# Patient Record
Sex: FEMALE | Race: BLACK OR AFRICAN AMERICAN | NOT HISPANIC OR LATINO | Employment: UNEMPLOYED | URBAN - METROPOLITAN AREA
[De-identification: names, ages, dates, MRNs, and addresses within clinical notes are randomized per-mention and may not be internally consistent; named-entity substitution may affect disease eponyms.]

---

## 2019-10-13 ENCOUNTER — HOSPITAL ENCOUNTER (EMERGENCY)
Facility: HOSPITAL | Age: 29
Discharge: HOME | End: 2019-10-13
Attending: EMERGENCY MEDICINE
Payer: MEDICAID

## 2019-10-13 VITALS
HEIGHT: 67 IN | WEIGHT: 155 LBS | RESPIRATION RATE: 14 BRPM | DIASTOLIC BLOOD PRESSURE: 56 MMHG | SYSTOLIC BLOOD PRESSURE: 140 MMHG | BODY MASS INDEX: 24.33 KG/M2 | HEART RATE: 73 BPM | OXYGEN SATURATION: 99 % | TEMPERATURE: 99 F

## 2019-10-13 DIAGNOSIS — Z59.00 HOMELESSNESS: ICD-10-CM

## 2019-10-13 DIAGNOSIS — K08.89 DENTALGIA: Primary | ICD-10-CM

## 2019-10-13 PROCEDURE — 99283 EMERGENCY DEPT VISIT LOW MDM: CPT

## 2019-10-13 RX ORDER — PENICILLIN V POTASSIUM 500 MG/1
500 TABLET, FILM COATED ORAL 4 TIMES DAILY
Qty: 40 TABLET | Refills: 0 | Status: SHIPPED | OUTPATIENT
Start: 2019-10-13 | End: 2019-10-13 | Stop reason: CLARIF

## 2019-10-13 RX ORDER — IBUPROFEN 600 MG/1
600 TABLET ORAL EVERY 6 HOURS PRN
Qty: 20 TABLET | Refills: 0 | Status: ON HOLD | OUTPATIENT
Start: 2019-10-13 | End: 2023-09-24 | Stop reason: HOSPADM

## 2019-10-13 RX ORDER — IBUPROFEN 600 MG/1
600 TABLET ORAL EVERY 6 HOURS PRN
Qty: 20 TABLET | Refills: 0 | Status: SHIPPED | OUTPATIENT
Start: 2019-10-13 | End: 2019-10-13 | Stop reason: SDUPTHER

## 2019-10-13 SDOH — SOCIAL DETERMINANTS OF HEALTH (SDOH): HOMELESSNESS UNSPECIFIED: Z59.00

## 2019-10-14 NOTE — ED PROVIDER NOTES
"Encounter Date: 10/13/2019    SCRIBE #1 NOTE: I, Kristi Teixeira, am scribing for, and in the presence of,  Guy J. Lefort, MD. I have scribed the entire note.       History     Chief Complaint   Patient presents with    Dental Pain     Pt presents to ED secondary to dental pain intermittent x1-2 years. Patient reports being homeless and being "put out by my friend today, so I figured I'd come for a checkup too."      Time seen by provider: 11:02 PM    This is a 29 y.o. female who presents with complaint of intermittent R lower dental pain x1-2 years that has worsened today. Her associated symptoms include RUE pain. The patient denies any N/V/D or other concerning symptoms. She is homeless as her friend recently kicked her out.    The history is provided by the patient.     Review of patient's allergies indicates:  No Known Allergies  History reviewed. No pertinent past medical history.  No past surgical history on file.  History reviewed. No pertinent family history.  Social History     Tobacco Use    Smoking status: Not on file   Substance Use Topics    Alcohol use: Not on file    Drug use: Not on file     Review of Systems   Constitutional: Negative for chills and fever.   HENT: Positive for dental problem (R lower dental pain). Negative for sore throat.    Eyes: Negative for visual disturbance.   Respiratory: Negative for shortness of breath.    Cardiovascular: Negative for chest pain.   Gastrointestinal: Negative for abdominal pain, nausea and vomiting.   Genitourinary: Negative for difficulty urinating, frequency and urgency.   Musculoskeletal: Negative for back pain.   Skin: Negative for rash and wound.   Neurological: Negative for syncope, weakness and headaches.   Psychiatric/Behavioral: Negative for agitation, behavioral problems and confusion.       Physical Exam     Initial Vitals [10/13/19 2242]   BP Pulse Resp Temp SpO2   (!) 140/56 73 14 98.8 °F (37.1 °C) 99 %      MAP       --         Physical " Exam    Nursing note and vitals reviewed.  Constitutional: She appears well-developed and well-nourished. She is not diaphoretic. No distress.   HENT:   Head: Normocephalic and atraumatic.   Mouth/Throat: Oropharynx is clear and moist.   R lower molar tooth decay w/o fluctuance; no submandibular swelling   Eyes: Conjunctivae and EOM are normal. Pupils are equal, round, and reactive to light.   Neck: Normal range of motion. Neck supple.   Cardiovascular: Normal rate, regular rhythm and normal heart sounds. Exam reveals no gallop and no friction rub.    No murmur heard.  Pulmonary/Chest: Breath sounds normal. She has no wheezes. She has no rhonchi. She has no rales.   Abdominal: Soft. Bowel sounds are normal. There is no tenderness. There is no rebound and no guarding.   Musculoskeletal: Normal range of motion. She exhibits no edema or tenderness.   Lymphadenopathy:     She has cervical adenopathy (mild R).   Neurological: She is alert and oriented to person, place, and time. She has normal strength.   Skin: Skin is warm and dry. Capillary refill takes less than 2 seconds. No rash noted.         ED Course   Procedures  Labs Reviewed - No data to display            Medical Decision Making:   Differential Diagnosis:   Differential Diagnosis includes, but is not limited to:  Dany's angina, acute necrotizing ulcerative gingivitis, epiglottitis, parotitis, gingival abscess, facial cellulitis, peritonsillar/retropharyngeal abscess, sialolithiasis, periapical abscess, pulpitis, dental fracture, dental caries, aphthous ulcer.    ED Management:  After complete evaluation, including thorough history and physical exam, the patient's symptoms are most consistent with benign dentalgia due to caries. Physical exam is benign, without significant facial, tongue, or neck swelling to suggest cellulitis, abscess, or Dany's angina. The patient is tolerating secretions without drooling, dysphagia, or voice changes to suggest deep  space neck infection. There is no intraoral or gingival fluctuance to suggest abscess requiring incision/drainage at this time.  Recommend symptomatic care with NSAIDs for pain in interim. Patient was instructed to follow-up with a dentist as soon as possible for further evaluation and tooth extraction if needed.                        Clinical Impression:       ICD-10-CM ICD-9-CM   1. Dentalgia K08.89 525.9   2. Homelessness Z59.0 V60.0         Disposition:   Disposition: Discharged  Condition: Stable           Scribe Attestation I, Dr. Guy Lefort, personally performed the services described in this documentation. All medical record entries made by the scribe were at my direction and in my presence. I have reviewed the chart and agree that the record reflects my personal performance and is accurate and complete. Guy Lefort, MD.  2:35 PM 10/14/2019                 Guy J. Lefort, MD  10/14/19 1435

## 2019-12-26 ENCOUNTER — HOSPITAL ENCOUNTER (EMERGENCY)
Facility: HOSPITAL | Age: 29
Discharge: HOME | End: 2019-12-26
Attending: EMERGENCY MEDICINE
Payer: MEDICAID

## 2019-12-26 VITALS
HEART RATE: 105 BPM | OXYGEN SATURATION: 99 % | DIASTOLIC BLOOD PRESSURE: 80 MMHG | WEIGHT: 155 LBS | TEMPERATURE: 99 F | RESPIRATION RATE: 20 BRPM | SYSTOLIC BLOOD PRESSURE: 138 MMHG | BODY MASS INDEX: 24.28 KG/M2

## 2019-12-26 DIAGNOSIS — L84 FOOT CALLUS: ICD-10-CM

## 2019-12-26 DIAGNOSIS — M79.89 LEG SWELLING: ICD-10-CM

## 2019-12-26 DIAGNOSIS — M79.672 CHRONIC PAIN OF BOTH FEET: ICD-10-CM

## 2019-12-26 DIAGNOSIS — I87.303 STASIS EDEMA OF BOTH LOWER EXTREMITIES: Primary | ICD-10-CM

## 2019-12-26 DIAGNOSIS — G89.29 CHRONIC PAIN OF BOTH FEET: ICD-10-CM

## 2019-12-26 DIAGNOSIS — M79.671 CHRONIC PAIN OF BOTH FEET: ICD-10-CM

## 2019-12-26 PROCEDURE — 93010 EKG 12-LEAD: ICD-10-PCS | Mod: ,,, | Performed by: INTERNAL MEDICINE

## 2019-12-26 PROCEDURE — 93010 ELECTROCARDIOGRAM REPORT: CPT | Mod: ,,, | Performed by: INTERNAL MEDICINE

## 2019-12-26 PROCEDURE — 99283 EMERGENCY DEPT VISIT LOW MDM: CPT | Mod: 25

## 2019-12-26 PROCEDURE — 93005 ELECTROCARDIOGRAM TRACING: CPT

## 2019-12-26 NOTE — ED PROVIDER NOTES
Encounter Date: 12/26/2019    SCRIBE #1 NOTE: I, Michelle Ahumada, am scribing for, and in the presence of,  Dr. Richards. I have scribed the entire note.       History     Chief Complaint   Patient presents with    Heel Pain     pt states she is homeless and has been doing alot of walking and outside x 1 yr, pt recently had infection under left breast, pt states bilateral leg swelling, and bottom of feet black     Lidia Ladd is a 29 y.o. female who  has no past medical history on file.    The patient presents to the ED due to chronic numbness, pain, and swelling to bilateral feet. Patient reports she has been homeless for over a year, and has been walking a lot outside. She reports she has been sitting at a bus stop bench for a long period of time, and is concerned that her feet have become swollen. She has been out in cold and rainy weather recently and does not have dry socks. She reports chronic numbness to her toes. She denies any weakness, difficulty ambulating, CP, SOB, fever, or any other complaints at this time. She mainly is seeking reassurance that she does not need an amputation of her feet.    The history is provided by the patient.     Review of patient's allergies indicates:   Allergen Reactions    Penicillins      No past medical history on file.  No past surgical history on file.  No family history on file.  Social History     Tobacco Use    Smoking status: Not on file   Substance Use Topics    Alcohol use: Not on file    Drug use: Not on file     Review of Systems   Constitutional: Negative for chills and fever.   HENT: Negative for sore throat.    Respiratory: Negative for cough and shortness of breath.    Cardiovascular: Positive for leg swelling. Negative for chest pain and palpitations.   Gastrointestinal: Negative for abdominal pain, constipation, diarrhea, nausea and vomiting.   Genitourinary: Negative for dysuria, frequency and urgency.   Musculoskeletal: Positive for myalgias.  Negative for arthralgias, back pain, gait problem, neck pain and neck stiffness.   Skin: Negative for rash and wound.   Neurological: Positive for numbness. Negative for dizziness, syncope, weakness and headaches.   Hematological: Does not bruise/bleed easily.   Psychiatric/Behavioral: Negative for agitation, behavioral problems and confusion.   All other systems reviewed and are negative.      Physical Exam     Initial Vitals [12/26/19 1613]   BP Pulse Resp Temp SpO2   (!) 140/79 (!) 121 18 98.9 °F (37.2 °C) 98 %      MAP       --         Physical Exam    Nursing note and vitals reviewed.  Constitutional: She appears well-developed and well-nourished. She is not diaphoretic. No distress.   HENT:   Head: Normocephalic and atraumatic.   Mouth/Throat: Oropharynx is clear and moist.   Eyes: EOM are normal. Pupils are equal, round, and reactive to light.   Neck: No tracheal deviation present.   Cardiovascular: Regular rhythm, normal heart sounds and intact distal pulses. Tachycardia present.    Mildly tachycardic.  DP and PT pulses are brisk and symmetric.   Pulmonary/Chest: Breath sounds normal. No stridor. No respiratory distress. She has no wheezes.   Abdominal: Soft. Bowel sounds are normal. She exhibits no distension and no mass. There is no tenderness.   Musculoskeletal: Normal range of motion. She exhibits no edema.        Right foot: There is swelling. There is normal range of motion, no tenderness, no bony tenderness, normal capillary refill, no crepitus, no deformity and no laceration.        Left foot: There is swelling. There is normal range of motion, no tenderness, no bony tenderness, normal capillary refill, no crepitus, no deformity and no laceration.   Non-pitting edema to both legs and feet.   Neurological: She is alert and oriented to person, place, and time. She has normal strength. No cranial nerve deficit or sensory deficit.   Skin: Skin is warm and dry. Capillary refill takes less than 2 seconds.  No abrasion and no rash noted. No pallor.   Chronic callous formation to the soles of both feet.  Capillary refill, DP/PT pulses brisk and symmetric.   Psychiatric: She has a normal mood and affect. Her behavior is normal. Thought content normal.         ED Course   Procedures  Labs Reviewed - No data to display  EKG Readings: (Independently Interpreted)   Initial Reading: No STEMI. Previous EKG: Compared with most recent EKG   Sinus tach, rate 110, no ST changes or ischemia, normal intervals.  No prior for comparison.          Medical Decision Making:   History:   Old Medical Records: I decided to obtain old medical records.  Old Records Summarized: other records.       <> Summary of Records: Patient was seen in ED 10/13 due to chronic dental pain and a check up due to homelessness. She was prescribed Ibuprofen and instructed to follow up with a dentist.  Differential Diagnosis:   Differential Diagnosis includes, but is not limited to:  Fracture, dislocation, compartment syndrome, nerve injury/palsy, vascular injury, rhabdomyolysis, hemarthrosis, septic joint, bursitis, muscle strain, ligament tear/sprain, laceration with foreign body, abrasion, soft tissue contusion, osteoarthritis  Clinical Tests:   Medical Tests: Ordered and Reviewed    On re-evaluation, the patient's status has improved.  After complete ED evaluation, clinical impression is most consistent with stasis edema, chronic foot pain, homelessness.  [facility] follow-up within 2-3 days was recommended.    After taking into careful account the patient's history, physical exam findings, as well as empirical and objective data obtained throughout ED workup, I feel no emergent medical condition has been identified. No further evaluation or admission was felt to be required, and the patient is stable for discharge from the ED. The patient and any additional family present were updated with test results, overall clinical impression, and recommended  further plan of care, including discharge instructions as provided and outpatient follow-up for continued evaluation and management as needed. All questions were answered. The patient expressed understanding and agreed with current plan for discharge and follow-up plan of care. Strict ED return precautions were provided, including return/worsening of current symptoms, new symptoms, or any other concerns.                     ED Course as of Dec 26 1749   Thu Dec 26, 2019   1714 28 yo F with history of homelessness presents to ED due to BLE swelling and numbness ongoing for the last several weeks.  Exam with chronic callous formation to soles of BLE. Cap refill, DP/PT pulses brisk and symmetric.  No indication for labs or imaging at this time.  Counseled on leg elevation, remaining mobile/ambulatory, foot hygiene, and given clinic info for [facility] for f/u as needed.    [SS]      ED Course User Index  [SS] Skyler Richards MD                Clinical Impression:     1. Stasis edema of both lower extremities    2. Leg swelling    3. Chronic pain of both feet    4. Foot callus        Disposition:   Disposition: Discharged  Condition: Stable      I, Dr. Skyler Richards, personally performed the services described in this documentation. All medical record entries made by the scribe were at my direction and in my presence.  I have reviewed the chart and agree that the record reflects my personal performance and is accurate and complete.     Skyler Richards MD.               Skyler Richadrs MD  12/26/19 1745

## 2019-12-26 NOTE — ED TRIAGE NOTES
Pt presents to ED with c/o numbness and edema to both feet. Pt states [place] is [place]. Pt states she has edema and numbness to both lower extremities when the weather gets cold. Denies c/o pain to feet. Noted redness to feet and warmth to touch.

## 2019-12-27 NOTE — ED NOTES
Security called for safe transfer of pt off the unit.  Pt was heard screaming to herself in the room and had bags of belongings in her possession.

## 2019-12-27 NOTE — ED NOTES
CARDIAC: Tachycardic, no murmur heard.   PERIPHERAL VASCULAR: peripheral pulses present. Normal cap refill. No edema. Warm to touch.    RESPIRATORY:Normal rate and effort, breath sounds clear bilaterally throughout chest. Respirations are equal and unlabored no obvious signs of distress.  GASTRO: soft, bowel sounds normal, no tenderness, no abdominal distention.  MUSC: No bony tenderness or soft tissue tenderness. No obvious deformity.  SKIN: Skin is warm and dry, normal skin turgor, mucous membranes moist.  NEURO: 5/5 strength major flexors/extensors bilaterally. Sensory intact to light touch bilaterally. Patterson coma scale: eyes open spontaneously-4, oriented & converses-5, obeys commands-6. No neurological abnormalities.   MENTAL STATUS: awake, alert and aware of environment.

## 2020-01-11 ENCOUNTER — HOSPITAL ENCOUNTER (EMERGENCY)
Facility: HOSPITAL | Age: 30
Discharge: HOME | End: 2020-01-11
Attending: EMERGENCY MEDICINE
Payer: MEDICAID

## 2020-01-11 VITALS
TEMPERATURE: 99 F | RESPIRATION RATE: 16 BRPM | DIASTOLIC BLOOD PRESSURE: 67 MMHG | HEART RATE: 100 BPM | BODY MASS INDEX: 24.33 KG/M2 | OXYGEN SATURATION: 98 % | SYSTOLIC BLOOD PRESSURE: 132 MMHG | HEIGHT: 67 IN | WEIGHT: 155 LBS

## 2020-01-11 DIAGNOSIS — Z59.00 HOMELESSNESS: ICD-10-CM

## 2020-01-11 DIAGNOSIS — M79.671 CHRONIC PAIN OF BOTH FEET: Primary | ICD-10-CM

## 2020-01-11 DIAGNOSIS — G89.29 CHRONIC PAIN OF BOTH FEET: Primary | ICD-10-CM

## 2020-01-11 DIAGNOSIS — I87.309 STASIS EDEMA, UNSPECIFIED LATERALITY: ICD-10-CM

## 2020-01-11 DIAGNOSIS — M79.672 CHRONIC PAIN OF BOTH FEET: Primary | ICD-10-CM

## 2020-01-11 PROCEDURE — 25000003 PHARM REV CODE 250: Performed by: STUDENT IN AN ORGANIZED HEALTH CARE EDUCATION/TRAINING PROGRAM

## 2020-01-11 PROCEDURE — 99283 EMERGENCY DEPT VISIT LOW MDM: CPT

## 2020-01-11 PROCEDURE — 99284 PR EMERGENCY DEPT VISIT,LEVEL IV: ICD-10-PCS | Mod: ,,, | Performed by: EMERGENCY MEDICINE

## 2020-01-11 PROCEDURE — 99284 EMERGENCY DEPT VISIT MOD MDM: CPT | Mod: ,,, | Performed by: EMERGENCY MEDICINE

## 2020-01-11 RX ORDER — ACETAMINOPHEN 500 MG
1000 TABLET ORAL
Status: COMPLETED | OUTPATIENT
Start: 2020-01-11 | End: 2020-01-11

## 2020-01-11 RX ADMIN — ACETAMINOPHEN 1000 MG: 500 TABLET ORAL at 08:01

## 2020-01-11 SDOH — SOCIAL DETERMINANTS OF HEALTH (SDOH): HOMELESSNESS UNSPECIFIED: Z59.00

## 2020-01-11 NOTE — ED NOTES
Patient left prior to receiving her paperwork. Patient not found in ED lobby or restroom at this time.

## 2020-01-11 NOTE — ED PROVIDER NOTES
Encounter Date: 1/11/2020       History     Chief Complaint   Patient presents with    Foot Pain     bilateral foot pain/pt unkempt     Lidia Ladd is 29 years old female patient without past medical history on file presents to the ED for bilateral feet pain and numbness that started around [place]. Patient contributed her symptoms to her cold weather in [place]. Pain increases at nights after long walks. Also associated with rash in her soles. Took tylenol without pain relieve. Patient is homeless for over years. Denies any weakness, arthralgia, fever, chills, CP, sob, abdominal pain, n/v, and diarrhea. Patient endorses dysuria.         Review of patient's allergies indicates:   Allergen Reactions    Penicillins      No past medical history on file.  No past surgical history on file.  History reviewed. No pertinent family history.  Social History     Tobacco Use    Smoking status: Not on file   Substance Use Topics    Alcohol use: Not on file    Drug use: Not on file     Review of Systems   Constitutional: Negative for appetite change, chills, diaphoresis and fever.   HENT: Negative for congestion, rhinorrhea, sinus pain and sore throat.    Respiratory: Negative for cough, shortness of breath and wheezing.    Cardiovascular: Negative for chest pain and leg swelling.   Gastrointestinal: Negative for abdominal distention, abdominal pain, blood in stool, constipation, diarrhea, nausea and vomiting.   Genitourinary: Negative for decreased urine volume, difficulty urinating, dyspareunia, hematuria, vaginal bleeding and vaginal discharge.   Musculoskeletal:        Bilateral feet pain   Skin: Positive for rash.   Neurological: Positive for numbness. Negative for dizziness, light-headedness and headaches.       Physical Exam     Initial Vitals [01/11/20 0737]   BP Pulse Resp Temp SpO2   132/67 100 16 98.6 °F (37 °C) 98 %      MAP       --         Physical Exam    Nursing note and vitals  reviewed.  Constitutional: She appears well-developed and well-nourished. She is not diaphoretic. No distress.   HENT:   Head: Normocephalic and atraumatic.   Eyes: EOM are normal. Pupils are equal, round, and reactive to light.   Neck: Normal range of motion.   Cardiovascular: Normal rate, regular rhythm, normal heart sounds and intact distal pulses.   No murmur heard.  Pulmonary/Chest: Breath sounds normal. No respiratory distress. She has no wheezes. She has no rales.   Abdominal: Soft. Bowel sounds are normal. She exhibits no distension. There is no tenderness.   Musculoskeletal: She exhibits tenderness (bilateral feet).   Swollen feet   Neurological: She is alert and oriented to person, place, and time. GCS score is 15. GCS eye subscore is 4. GCS verbal subscore is 5. GCS motor subscore is 6.   Skin: Skin is warm and dry. Capillary refill takes less than 2 seconds.         ED Course   Procedures  Labs Reviewed - No data to display       Imaging Results    None          Medical Decision Making:   Initial Assessment:   Counseled on leg elevation, remaining mobile/ambulatory, foot hygiene, and given clinic info for [facility] for f/u as needed.   29 years old homeless female without past medical history presents to the ED for bilateral feet pain, numbness and swelling for the past 2 weeks. Tender feet on exam, normal capillary refill, brisk symmetric DP/PT pulses. Patient initially complained of dysuria but on further questioning she denied it. No indication for labs and imaging at this time.   ED Management:  Clinical impression is most consistent with chronic foot pain, homelessness, and stasis edema. Patient given tylenol, educated on foot hygiene, keep ambulating, and elevate legs if feet are swollen.  After complete ED evaluation, no labs or imaging is required. Admission was not warranted. Patient is stable for discharge from the ED.   Other:   I discussed test(s) with the performing physician.                                  Clinical Impression:       ICD-10-CM ICD-9-CM   1. Chronic pain of both feet M79.671 729.5    G89.29 338.29    M79.672    2. Homelessness Z59.0 V60.0   3. Stasis edema, unspecified laterality I87.309 459.30         Disposition:   Disposition: Discharged  Condition: Stable                     Fuentes Hawk MD  Resident  01/11/20 0841       Fuentes Hawk MD  Resident  01/11/20 0851

## 2023-09-16 ENCOUNTER — HOSPITAL ENCOUNTER (EMERGENCY)
Facility: HOSPITAL | Age: 33
Discharge: PSYCHIATRIC OR SUBSTANCE USE FACILITY | End: 2023-09-16
Attending: EMERGENCY MEDICINE
Payer: MEDICAID

## 2023-09-16 VITALS
WEIGHT: 155 LBS | BODY MASS INDEX: 24.33 KG/M2 | SYSTOLIC BLOOD PRESSURE: 148 MMHG | HEART RATE: 76 BPM | HEIGHT: 67 IN | OXYGEN SATURATION: 99 % | DIASTOLIC BLOOD PRESSURE: 89 MMHG | TEMPERATURE: 99 F | RESPIRATION RATE: 16 BRPM

## 2023-09-16 DIAGNOSIS — F22 PARANOID: Primary | ICD-10-CM

## 2023-09-16 LAB
ALBUMIN SERPL BCP-MCNC: 4.1 G/DL (ref 3.5–5.2)
ALP SERPL-CCNC: 55 U/L (ref 55–135)
ALT SERPL W/O P-5'-P-CCNC: 20 U/L (ref 10–44)
AMPHET+METHAMPHET UR QL: NEGATIVE
ANION GAP SERPL CALC-SCNC: 13 MMOL/L (ref 8–16)
APAP SERPL-MCNC: <3 UG/ML (ref 10–20)
AST SERPL-CCNC: 20 U/L (ref 10–40)
B-HCG UR QL: NEGATIVE
BACTERIA #/AREA URNS AUTO: ABNORMAL /HPF
BARBITURATES UR QL SCN>200 NG/ML: NEGATIVE
BASOPHILS # BLD AUTO: 0.03 K/UL (ref 0–0.2)
BASOPHILS NFR BLD: 0.5 % (ref 0–1.9)
BENZODIAZ UR QL SCN>200 NG/ML: NEGATIVE
BILIRUB SERPL-MCNC: 0.4 MG/DL (ref 0.1–1)
BILIRUB UR QL STRIP: NEGATIVE
BUN SERPL-MCNC: 5 MG/DL (ref 6–20)
BZE UR QL SCN: NEGATIVE
CALCIUM SERPL-MCNC: 9.3 MG/DL (ref 8.7–10.5)
CANNABINOIDS UR QL SCN: NEGATIVE
CHLORIDE SERPL-SCNC: 109 MMOL/L (ref 95–110)
CLARITY UR REFRACT.AUTO: ABNORMAL
CO2 SERPL-SCNC: 19 MMOL/L (ref 23–29)
COLOR UR AUTO: YELLOW
CREAT SERPL-MCNC: 0.9 MG/DL (ref 0.5–1.4)
CREAT UR-MCNC: 396 MG/DL (ref 15–325)
CTP QC/QA: YES
DIFFERENTIAL METHOD: ABNORMAL
EOSINOPHIL # BLD AUTO: 0 K/UL (ref 0–0.5)
EOSINOPHIL NFR BLD: 0.5 % (ref 0–8)
ERYTHROCYTE [DISTWIDTH] IN BLOOD BY AUTOMATED COUNT: 16.5 % (ref 11.5–14.5)
EST. GFR  (NO RACE VARIABLE): >60 ML/MIN/1.73 M^2
ETHANOL SERPL-MCNC: <10 MG/DL
GLUCOSE SERPL-MCNC: 104 MG/DL (ref 70–110)
GLUCOSE UR QL STRIP: NEGATIVE
HCT VFR BLD AUTO: 33.4 % (ref 37–48.5)
HGB BLD-MCNC: 11.3 G/DL (ref 12–16)
HGB UR QL STRIP: ABNORMAL
HYALINE CASTS UR QL AUTO: 3 /LPF
IMM GRANULOCYTES # BLD AUTO: 0.03 K/UL (ref 0–0.04)
IMM GRANULOCYTES NFR BLD AUTO: 0.5 % (ref 0–0.5)
KETONES UR QL STRIP: NEGATIVE
LEUKOCYTE ESTERASE UR QL STRIP: ABNORMAL
LYMPHOCYTES # BLD AUTO: 1.7 K/UL (ref 1–4.8)
LYMPHOCYTES NFR BLD: 29.5 % (ref 18–48)
MCH RBC QN AUTO: 25.9 PG (ref 27–31)
MCHC RBC AUTO-ENTMCNC: 33.8 G/DL (ref 32–36)
MCV RBC AUTO: 76 FL (ref 82–98)
METHADONE UR QL SCN>300 NG/ML: NEGATIVE
MICROSCOPIC COMMENT: ABNORMAL
MONOCYTES # BLD AUTO: 0.5 K/UL (ref 0.3–1)
MONOCYTES NFR BLD: 7.9 % (ref 4–15)
NEUTROPHILS # BLD AUTO: 3.5 K/UL (ref 1.8–7.7)
NEUTROPHILS NFR BLD: 61.1 % (ref 38–73)
NITRITE UR QL STRIP: NEGATIVE
NRBC BLD-RTO: 0 /100 WBC
OPIATES UR QL SCN: NEGATIVE
PCP UR QL SCN>25 NG/ML: NEGATIVE
PH UR STRIP: 6 [PH] (ref 5–8)
PLATELET # BLD AUTO: 412 K/UL (ref 150–450)
PMV BLD AUTO: 9.5 FL (ref 9.2–12.9)
POTASSIUM SERPL-SCNC: 3.7 MMOL/L (ref 3.5–5.1)
PROT SERPL-MCNC: 7.8 G/DL (ref 6–8.4)
PROT UR QL STRIP: ABNORMAL
RBC # BLD AUTO: 4.37 M/UL (ref 4–5.4)
RBC #/AREA URNS AUTO: 8 /HPF (ref 0–4)
SODIUM SERPL-SCNC: 141 MMOL/L (ref 136–145)
SP GR UR STRIP: 1.02 (ref 1–1.03)
SQUAMOUS #/AREA URNS AUTO: 9 /HPF
TOXICOLOGY INFORMATION: ABNORMAL
TSH SERPL DL<=0.005 MIU/L-ACNC: 0.73 UIU/ML (ref 0.4–4)
URN SPEC COLLECT METH UR: ABNORMAL
WBC # BLD AUTO: 5.8 K/UL (ref 3.9–12.7)
WBC #/AREA URNS AUTO: 1 /HPF (ref 0–5)

## 2023-09-16 PROCEDURE — 85025 COMPLETE CBC W/AUTO DIFF WBC: CPT | Performed by: EMERGENCY MEDICINE

## 2023-09-16 PROCEDURE — 80143 DRUG ASSAY ACETAMINOPHEN: CPT | Performed by: EMERGENCY MEDICINE

## 2023-09-16 PROCEDURE — 81025 URINE PREGNANCY TEST: CPT | Performed by: EMERGENCY MEDICINE

## 2023-09-16 PROCEDURE — 84443 ASSAY THYROID STIM HORMONE: CPT | Performed by: EMERGENCY MEDICINE

## 2023-09-16 PROCEDURE — 80307 DRUG TEST PRSMV CHEM ANLYZR: CPT | Performed by: EMERGENCY MEDICINE

## 2023-09-16 PROCEDURE — 81001 URINALYSIS AUTO W/SCOPE: CPT | Performed by: EMERGENCY MEDICINE

## 2023-09-16 PROCEDURE — 80053 COMPREHEN METABOLIC PANEL: CPT | Performed by: EMERGENCY MEDICINE

## 2023-09-16 PROCEDURE — 82077 ASSAY SPEC XCP UR&BREATH IA: CPT | Performed by: EMERGENCY MEDICINE

## 2023-09-16 PROCEDURE — 99285 EMERGENCY DEPT VISIT HI MDM: CPT
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
## 2023-09-16 NOTE — ED TRIAGE NOTES
Lidia Ladd, a 33 y.o. female presents to the ED w/ complaint of delusional thoughts and ideas, states that she is from another planet and being paranoid. Pt thinks people are out to get her. Pt denies homicidal ad suicidal thoughts.     Triage note:  Chief Complaint   Patient presents with    Psychiatric Evaluation     EMS reports patient delusional, states she is from another [place], paranoid     Review of patient's allergies indicates:   Allergen Reactions    Penicillins      No past medical history on file.

## 2023-09-16 NOTE — ED PROVIDER NOTES
"Encounter Date: 9/16/2023       History     Chief Complaint   Patient presents with    Psychiatric Evaluation     EMS reports patient delusional, states she is from another [place], paranoid     33-year-old unhoused no significant past medical history presenting with bizarre behavior.  As per EMS patient noted to be delusional mentioning she is from "another [place]".  While speaking to patient patient denies saying this statement.  Patient mentions having no complaints at this time.  Patient does not know why she was brought to the emergency room, mentions an onlay worker inappropriately called the police who had the ambulance take her to the hospital without a proper recent.  Patient mentions while here " police have always been following me" ever since I became unhoused..  Patient denies any auditory hallucinations, suicidal or homicidal ideation.  Patient mentions having visual hallucinations however unable to explain describe them to this provider.      Review of patient's allergies indicates:   Allergen Reactions    Penicillins      No past medical history on file.  No past surgical history on file.  No family history on file.     Review of Systems    Physical Exam     Initial Vitals [09/16/23 1546]   BP Pulse Resp Temp SpO2   (!) 146/86 96 16 97.8 °F (36.6 °C) 100 %      MAP       --         Physical Exam    Constitutional: She appears well-developed and well-nourished. She is not diaphoretic. No distress.   HENT:   Head: Normocephalic and atraumatic.   Eyes: Conjunctivae and EOM are normal. Pupils are equal, round, and reactive to light. Right eye exhibits no discharge. Left eye exhibits no discharge. No scleral icterus.   Neck: Neck supple.   Normal range of motion.  Cardiovascular:  Normal rate and regular rhythm.     Exam reveals no friction rub.       No murmur heard.  Pulmonary/Chest: Breath sounds normal. No respiratory distress. She has no wheezes. She has no rales.   Abdominal: Abdomen is soft. Bowel " sounds are normal. She exhibits no distension. There is no abdominal tenderness. There is no rebound and no guarding.   Musculoskeletal:         General: Normal range of motion.      Cervical back: Normal range of motion and neck supple.      Comments: Bilateral lower extremity pustular rash no active purulent drainage, bleeding nontender to palpation     Neurological: She is alert and oriented to person, place, and time. No cranial nerve deficit or sensory deficit. GCS score is 15. GCS eye subscore is 4. GCS verbal subscore is 5. GCS motor subscore is 6.   Skin: Skin is warm and dry. No erythema. No pallor.   Psychiatric: She has a normal mood and affect. Her behavior is normal. Judgment and thought content normal.         ED Course   Procedures  Labs Reviewed   CBC W/ AUTO DIFFERENTIAL - Abnormal; Notable for the following components:       Result Value    Hemoglobin 11.3 (*)     Hematocrit 33.4 (*)     MCV 76 (*)     MCH 25.9 (*)     RDW 16.5 (*)     All other components within normal limits   COMPREHENSIVE METABOLIC PANEL - Abnormal; Notable for the following components:    CO2 19 (*)     BUN 5 (*)     All other components within normal limits   URINALYSIS, REFLEX TO URINE CULTURE - Abnormal; Notable for the following components:    Appearance, UA Hazy (*)     Protein, UA 1+ (*)     Occult Blood UA 3+ (*)     Leukocytes, UA Trace (*)     All other components within normal limits    Narrative:     Specimen Source->Urine   DRUG SCREEN PANEL, URINE EMERGENCY - Abnormal; Notable for the following components:    Creatinine, Urine 396.0 (*)     All other components within normal limits    Narrative:     Specimen Source->Urine   ACETAMINOPHEN LEVEL - Abnormal; Notable for the following components:    Acetaminophen (Tylenol), Serum <3.0 (*)     All other components within normal limits   URINALYSIS MICROSCOPIC - Abnormal; Notable for the following components:    RBC, UA 8 (*)     Bacteria Many (*)     Hyaline Casts, UA  3 (*)     All other components within normal limits    Narrative:     Specimen Source->Urine   TSH   ALCOHOL,MEDICAL (ETHANOL)   POCT URINE PREGNANCY          Imaging Results    None          Medications - No data to display  Medical Decision Making  33-year-old presenting with bizarre behavior has witnessed by EMS.  At this time patient denies any suicidal ideation, homicidal ideation, visual or auditory hallucinations.    PE noted for pustular vesicle rash bilateral lower extremities    DX includes paranoia, schizophrenia, drug ingestion, thyroid pathology, lower extremity rash possibly dermatitis,    Plan:  Considering patient's presentation will consult psychiatry for evaluation, PEC, psych hold orders, laboratory testing reassess.    Amount and/or Complexity of Data Reviewed  Labs: ordered.               ED Course as of 09/17/23 1854   Sat Sep 16, 2023   1650 Consult psychiatry will come to evaluate patient. [DC]   1817 Spoke with psychiatry considering patient's social determinants of health on housed her paranoia at odd behavior plan for continue PE see and look for psychiatric placement. [DC]   1949 Laboratory testing unremarkable.  Patient medically clear will arrange for psych transfer. [DC]      ED Course User Index  [DC] Emani Mcdonald Jr., MD       Medically cleared for psychiatry placement: 9/16/2023  7:50 PM            Clinical Impression:   Final diagnoses:  [F22] Paranoid (Primary)        ED Disposition Condition    Transfer to Psych Facility Stable          ED Prescriptions    None       Follow-up Information    None          Emani Mcdonald Jr., MD  09/17/23 1854

## 2023-09-16 NOTE — ED NOTES
Patient Belongings locked in closet     1 rainbow colored purse  1 black umbrella  1 blue shirt  1 gray jacket  1 pairs of black pants  1 pair of black sneakers  1 black backpack

## 2023-09-24 PROBLEM — F20.0 PARANOID SCHIZOPHRENIA: Status: ACTIVE | Noted: 2023-09-24

## 2023-09-27 ENCOUNTER — HOSPITAL ENCOUNTER (EMERGENCY)
Facility: OTHER | Age: 33
Discharge: HOME | End: 2023-09-27
Attending: EMERGENCY MEDICINE
Payer: MEDICAID

## 2023-09-27 VITALS
RESPIRATION RATE: 18 BRPM | HEART RATE: 80 BPM | SYSTOLIC BLOOD PRESSURE: 143 MMHG | DIASTOLIC BLOOD PRESSURE: 88 MMHG | TEMPERATURE: 98 F | OXYGEN SATURATION: 98 %

## 2023-09-27 DIAGNOSIS — Z01.84 COVID-19 VIRUS ANTIBODY NEGATIVE: ICD-10-CM

## 2023-09-27 DIAGNOSIS — Z59.01 RESIDES IN SHELTER: Primary | ICD-10-CM

## 2023-09-27 PROCEDURE — 99283 EMERGENCY DEPT VISIT LOW MDM: CPT | Mod: 25

## 2023-09-27 SDOH — SOCIAL DETERMINANTS OF HEALTH (SDOH): SHELTERED HOMELESSNESS: Z59.01

## 2023-09-27 NOTE — FIRST PROVIDER EVALUATION
Emergency Department TeleTriage Encounter Note      CHIEF COMPLAINT    Chief Complaint   Patient presents with    Shelter Clearance     Pt requesting covid and TB test for shelter clearance    Facial Pain     Pt also complaining of L sided facial pain x 10 days, no injury       VITAL SIGNS   Initial Vitals [09/27/23 1658]   BP Pulse Resp Temp SpO2   (!) 150/82 88 20 98.8 °F (37.1 °C) 98 %      MAP       --            ALLERGIES    Review of patient's allergies indicates:   Allergen Reactions    Penicillins        PROVIDER TRIAGE NOTE  This is a teletriage evaluation of a 33 y.o. female presenting to the ED complaining of shelter clearance. Patient requesting COVID and TB screening for shelter clearance. She also complains of left sided facial pain for 10 days while admitted to psychiatric unit. She denies trauma or injury, numbness, or tingling.     Patient is alert and oriented. She speaks in complete sentences. She is sitting upright in the chair in no distress. No facial droop.    Initial orders will be placed and care will be transferred to an alternate provider when patient is roomed for a full evaluation. Any additional orders and the final disposition will be determined by that provider.         ORDERS  Labs Reviewed   SARS-COV-2 RNA AMPLIFICATION, QUAL   POCT URINE PREGNANCY       ED Orders (720h ago, onward)      Start Ordered     Status Ordering Provider    09/27/23 1708 09/27/23 1707  POCT urine pregnancy  Once         Ordered ELIZABETH DAVILA    09/27/23 1707 09/27/23 1707  COVID-19 Rapid Screening  STAT         Ordered ELIZABETH DAVILA    09/27/23 1707 09/27/23 1707  X-Ray Chest PA And Lateral  1 time imaging         Ordered ELIZABETH DAVILA              Virtual Visit Note: The provider triage portion of this emergency department evaluation and documentation was performed via [employer], a HIPAA-compliant telemedicine application, in concert with a tele-presenter in the room. A face to face patient  evaluation with one of my colleagues will occur once the patient is placed in an emergency department room.      DISCLAIMER: This note was prepared with [employer] voice recognition transcription software. Garbled syntax, mangled pronouns, and other bizarre constructions may be attributed to that software system.

## 2023-09-27 NOTE — DISCHARGE INSTRUCTIONS
COVID Negative.  No cavitating lesions on CXR to suggest TB infection.  Patient is cleared for shelter clearance.

## 2023-09-27 NOTE — ED PROVIDER NOTES
Source of History:  Patient    Chief complaint:  Shelter Clearance (Pt requesting covid and TB test for shelter clearance) and Facial Pain (Pt also complaining of L sided facial pain x 10 days, no injury)      HPI:  Lidia Ladd is a 33 y.o. female presenting with need for COVID and Chest xray to be allowed entrance into the [facility].  The patient denies any cough, congestion, fever/chills or any night sweats.      This is the extent to the patients complaints today here in the emergency department.    PMH:  As per HPI and below:  History reviewed. No pertinent past medical history.  History reviewed. No pertinent surgical history.       Review of patient's allergies indicates:   Allergen Reactions    Penicillins        ROS: As per HPI and below:  General: No fever, No chills.  ENT: No Cough/congestion   Head:  No headache.    Chest:  No shortness of breath.  Cardiovascular: No chest pain.  Integument: No rashes or lesions.    Physical Exam:    BP (!) 150/82   Pulse 88   Temp 98.8 °F (37.1 °C) (Oral)   Resp 20   LMP 09/17/2023   SpO2 98%   Vitals:    09/27/23 1658   BP: (!) 150/82   Pulse: 88   Resp: 20   Temp: 98.8 °F (37.1 °C)   TempSrc: Oral   SpO2: 98%       Nursing note and vital signs reviewed.  Appearance: No acute distress.  Well-appearing, nontoxic  ENT: MM are pink and moist.     Chest/ Respiratory:  Clear to auscultation bilaterally.  Good air movement.  No wheezes.  No rhonchi. No rales. No accessory muscle use.  Cardiovascular:  Regular rate and rhythm.  No murmurs. No gallops. No rubs.  Musculoskeletal: Neck supple.  No meningismus.  Neuro: alert and oriented x3,  no focal neurological deficits.  Psych: Appropriate, conversant    Labs that have been ordered have been independently reviewed and interpreted by myself.  Labs Reviewed   SARS-COV-2 RNA AMPLIFICATION, QUAL - Normal   POCT URINE PREGNANCY       X-Ray Chest PA And Lateral   Final Result      No acute process.          Electronically signed by: Nawaf Chambers MD   Date:    09/27/2023   Time:    17:46            Initial Impression/ Differential Dx:  Differential Diagnosis includes, but is not limited to:  meningitis, nasal foreign body, otitis media/external, bacterial sinusitis, allergic rhinitis, influenza, bacterial/viral pharyngitis, bacterial/viral pneumonia, covid-19      MDM:    33 y.o. female with need for COVID test and cxr to be allowed into the [facility], negative covid swab in the ED and negative screening cxr.      Chest x-ray with no focal consolidation or findings to suggest active TB.  No cardiac enlargement.  Rapid COVID is negative.  Will provide patient with results. Strict instructions to follow up with primary care physician for further assessment and evaluation. Given instructions to return for any acute symptoms and verbalized understanding of this medical plan.    ED Course as of 09/27/23 1802   Wed Sep 27, 2023   1727 SARS-CoV-2 RNA, Amplification, Qual: Negative [CU]   1754 X-Ray Chest PA And Lateral  Normal chest x-ray [CU]      ED Course User Index  [CU] Glen Peralta NP       Diagnostic Impression:    1. Resides in shelter    2. COVID-19 virus antibody negative         ED Disposition Condition    Discharge Good            ED Prescriptions    None       Follow-up Information    None              Glen Peralta NP  09/27/23 1802

## 2023-10-04 ENCOUNTER — HOSPITAL ENCOUNTER (EMERGENCY)
Facility: OTHER | Age: 33
Discharge: HOME | End: 2023-10-05
Attending: EMERGENCY MEDICINE
Payer: MEDICAID

## 2023-10-04 DIAGNOSIS — R60.9 DEPENDENT EDEMA: Primary | ICD-10-CM

## 2023-10-04 PROCEDURE — 99284 EMERGENCY DEPT VISIT MOD MDM: CPT

## 2023-10-04 PROCEDURE — 93005 ELECTROCARDIOGRAM TRACING: CPT

## 2023-10-04 PROCEDURE — 93010 ELECTROCARDIOGRAM REPORT: CPT | Mod: ,,, | Performed by: INTERNAL MEDICINE

## 2023-10-04 PROCEDURE — 93010 EKG 12-LEAD: ICD-10-PCS | Mod: ,,, | Performed by: INTERNAL MEDICINE

## 2023-10-04 PROCEDURE — 80053 COMPREHEN METABOLIC PANEL: CPT | Performed by: EMERGENCY MEDICINE

## 2023-10-04 PROCEDURE — 87389 HIV-1 AG W/HIV-1&-2 AB AG IA: CPT | Performed by: EMERGENCY MEDICINE

## 2023-10-04 PROCEDURE — 86803 HEPATITIS C AB TEST: CPT | Performed by: EMERGENCY MEDICINE

## 2023-10-04 PROCEDURE — 83880 ASSAY OF NATRIURETIC PEPTIDE: CPT | Performed by: EMERGENCY MEDICINE

## 2023-10-04 RX ORDER — ACETAMINOPHEN 500 MG
1000 TABLET ORAL
Status: COMPLETED | OUTPATIENT
Start: 2023-10-05 | End: 2023-10-04

## 2023-10-04 RX ADMIN — ACETAMINOPHEN 1000 MG: 500 TABLET ORAL at 11:10

## 2023-10-05 VITALS
OXYGEN SATURATION: 100 % | TEMPERATURE: 98 F | RESPIRATION RATE: 19 BRPM | DIASTOLIC BLOOD PRESSURE: 68 MMHG | SYSTOLIC BLOOD PRESSURE: 111 MMHG | WEIGHT: 175 LBS | HEART RATE: 77 BPM | BODY MASS INDEX: 27.41 KG/M2

## 2023-10-05 LAB
ALBUMIN SERPL BCP-MCNC: 3.8 G/DL (ref 3.5–5.2)
ALP SERPL-CCNC: 53 U/L (ref 55–135)
ALT SERPL W/O P-5'-P-CCNC: 31 U/L (ref 10–44)
ANION GAP SERPL CALC-SCNC: 13 MMOL/L (ref 8–16)
ANISOCYTOSIS BLD QL SMEAR: SLIGHT
AST SERPL-CCNC: 34 U/L (ref 10–40)
B-HCG UR QL: NEGATIVE
BASOPHILS # BLD AUTO: 0.02 K/UL (ref 0–0.2)
BASOPHILS NFR BLD: 0.3 % (ref 0–1.9)
BILIRUB SERPL-MCNC: 0.2 MG/DL (ref 0.1–1)
BNP SERPL-MCNC: <10 PG/ML (ref 0–99)
BUN SERPL-MCNC: 16 MG/DL (ref 6–20)
CALCIUM SERPL-MCNC: 9.4 MG/DL (ref 8.7–10.5)
CHLORIDE SERPL-SCNC: 106 MMOL/L (ref 95–110)
CO2 SERPL-SCNC: 19 MMOL/L (ref 23–29)
CREAT SERPL-MCNC: 0.9 MG/DL (ref 0.5–1.4)
CTP QC/QA: YES
DIFFERENTIAL METHOD: ABNORMAL
EOSINOPHIL # BLD AUTO: 0.2 K/UL (ref 0–0.5)
EOSINOPHIL NFR BLD: 2.4 % (ref 0–8)
ERYTHROCYTE [DISTWIDTH] IN BLOOD BY AUTOMATED COUNT: 16.5 % (ref 11.5–14.5)
EST. GFR  (NO RACE VARIABLE): >60 ML/MIN/1.73 M^2
GLUCOSE SERPL-MCNC: 93 MG/DL (ref 70–110)
HCT VFR BLD AUTO: 30.3 % (ref 37–48.5)
HCV AB SERPL QL IA: NEGATIVE
HGB BLD-MCNC: 10.5 G/DL (ref 12–16)
HIV 1+2 AB+HIV1 P24 AG SERPL QL IA: NEGATIVE
IMM GRANULOCYTES # BLD AUTO: 0.02 K/UL (ref 0–0.04)
IMM GRANULOCYTES NFR BLD AUTO: 0.3 % (ref 0–0.5)
LYMPHOCYTES # BLD AUTO: 2.4 K/UL (ref 1–4.8)
LYMPHOCYTES NFR BLD: 36.4 % (ref 18–48)
MCH RBC QN AUTO: 25.9 PG (ref 27–31)
MCHC RBC AUTO-ENTMCNC: 34.7 G/DL (ref 32–36)
MCV RBC AUTO: 75 FL (ref 82–98)
MONOCYTES # BLD AUTO: 0.5 K/UL (ref 0.3–1)
MONOCYTES NFR BLD: 8.1 % (ref 4–15)
NEUTROPHILS # BLD AUTO: 3.5 K/UL (ref 1.8–7.7)
NEUTROPHILS NFR BLD: 52.5 % (ref 38–73)
NRBC BLD-RTO: 0 /100 WBC
PLATELET # BLD AUTO: 259 K/UL (ref 150–450)
PLATELET BLD QL SMEAR: ABNORMAL
PMV BLD AUTO: 9.4 FL (ref 9.2–12.9)
POTASSIUM SERPL-SCNC: 4.2 MMOL/L (ref 3.5–5.1)
PROT SERPL-MCNC: 7.4 G/DL (ref 6–8.4)
RBC # BLD AUTO: 4.05 M/UL (ref 4–5.4)
SODIUM SERPL-SCNC: 138 MMOL/L (ref 136–145)
WBC # BLD AUTO: 6.65 K/UL (ref 3.9–12.7)

## 2023-10-05 PROCEDURE — 81025 URINE PREGNANCY TEST: CPT | Performed by: EMERGENCY MEDICINE

## 2023-10-05 PROCEDURE — 85025 COMPLETE CBC W/AUTO DIFF WBC: CPT | Performed by: EMERGENCY MEDICINE

## 2023-10-05 PROCEDURE — 25000003 PHARM REV CODE 250: Performed by: EMERGENCY MEDICINE

## 2023-10-05 RX ORDER — HYDROCHLOROTHIAZIDE 25 MG/1
25 TABLET ORAL DAILY
Qty: 7 TABLET | Refills: 0 | Status: SHIPPED | OUTPATIENT
Start: 2023-10-05 | End: 2023-10-12

## 2023-10-05 NOTE — ED PROVIDER NOTES
Chief Complaint   Leg Pain (C/o leg pain & swelling. Pt also reports bug bites. +dizziness +sob/cp. Unrelieved with tylenol & ibuprofen. )      History Of Present Illness   Lidia Ladd is a 33 y.o. female presenting with bilateral leg swelling, left leg, left facial and neck pain that have been present for a few days.  She is frequently on her feet at work.  The facial pain is at the lower mandible, but she denies any toothache.  No fever or chills.  No shortness of breath, chest pain, abdominal swelling.        Review of patient's allergies indicates:   Allergen Reactions    Penicillins        No current facility-administered medications on file prior to encounter.     Current Outpatient Medications on File Prior to Encounter   Medication Sig Dispense Refill    [START ON 10/25/2023] haloperidol decanoate (HALDOL DECANOATE) 50 mg/mL injection Inject 4 mLs (200 mg total) into the muscle every 30 days. 4 mL 11    traZODone (DESYREL) 50 MG tablet Take 1 tablet (50 mg total) by mouth every evening. 30 tablet 0       Past History   As per HPI and below:  No past medical history on file.  No past surgical history on file.    Social History     Socioeconomic History    Marital status: Single       No family history on file.    Physical Exam     Vitals:    10/04/23 2303 10/04/23 2339 10/05/23 0015 10/05/23 0115   BP: 133/85 126/78 111/68 111/68   BP Location: Left arm Left arm Left arm    Patient Position:  Lying Lying    Pulse: 99 98 77    Resp: 20  19    Temp: 98 °F (36.7 °C) 98.3 °F (36.8 °C) 98 °F (36.7 °C)    TempSrc: Oral Oral Oral    SpO2: 98% 100% 100%    Weight: 79.4 kg (175 lb)        Appearance: No acute distress.  Skin: No rashes seen.  Good turgor.  No abrasions.  No ecchymoses.    Chest: Clear to auscultation bilaterally.  Good air movement.  No wheezes.  No rhonchi.  Cardiovascular: Regular rate and rhythm.  No murmurs. No gallops. No rubs. 1+ bilateral non-pitting leg edema.  Abdomen: Soft.  Not  distended.  Nontender.  No guarding.  No rebound.  Musculoskeletal: Good range of motion all joints.  No deformities.  Neck supple with painless full range of motion.  No meningismus.  Neurologic: Motor intact.  Sensation intact.  Cranial nerves intact.  Mental Status:  Alert and oriented x 3.  Appropriate, conversant.      Initial MDM   Left-sided pain that is nonspecific and seems musculoskeletal.  She also has bilateral ankle  edema.  It is nonpitting.  I check some basic labs to exclude more serious causes.  No calf pain, doubt DVT.  Anticipate discharge.    Medications Given     Medications   acetaminophen tablet 1,000 mg (1,000 mg Oral Given 10/4/23 7914)       Results and Course     Labs Reviewed   CBC W/ AUTO DIFFERENTIAL - Abnormal; Notable for the following components:       Result Value    Hemoglobin 10.5 (*)     Hematocrit 30.3 (*)     MCV 75 (*)     MCH 25.9 (*)     RDW 16.5 (*)     Platelet Estimate Clumped (*)     All other components within normal limits    Narrative:     Release to patient->Immediate   COMPREHENSIVE METABOLIC PANEL - Abnormal; Notable for the following components:    CO2 19 (*)     Alkaline Phosphatase 53 (*)     All other components within normal limits    Narrative:     Release to patient->Immediate   HIV 1 / 2 ANTIBODY    Narrative:     Release to patient->Immediate   HEPATITIS C ANTIBODY    Narrative:     Release to patient->Immediate   B-TYPE NATRIURETIC PEPTIDE    Narrative:     Release to patient->Immediate   POCT URINE PREGNANCY       Imaging Results    None         ED Course as of 10/06/23 0857   Thu Oct 05, 2023   0004 Preg Test, Ur: Negative [DC]   0040 WBC: 6.65 [DC]   0040 Hemoglobin(!): 10.5 [DC]   0042 EKG 12-lead  EKG shows normal sinus rhythm and no acute ischemia per my independent interpretation.     [DC]   0052 Platelet Count: 259 [DC]   0058 BNP: <10 [DC]      ED Course User Index  [DC] Narciso Deleon MD           MDM, Impression and Plan   33 y.o. female  with leg swelling in both legs, prolonged standing, non-pitting.  Good pulses.  I think this is dependent edema.  Will recommend compression stockings and give HCTZ for one week.  The facial pain is nonspecific and I see no dental abscess.  Will recommend OTC meds.           Final diagnoses:  [R60.9] Dependent edema (Primary)        ED Disposition Condition    Discharge Stable          ED Prescriptions       Medication Sig Dispense Start Date End Date Auth. Provider    hydroCHLOROthiazide (HYDRODIURIL) 25 MG tablet Take 1 tablet (25 mg total) by mouth once daily. for 7 days 7 tablet 10/5/2023 10/12/2023 Narciso Deleon MD          Follow-up Information       Follow up With Specialties Details Why Contact Info    Your primary care doctor  In 1 week                 Narciso Deleon MD  10/06/23 8262

## 2023-10-05 NOTE — ED TRIAGE NOTES
"Patient presents to ED C/O bilateral leg pain, swelling, oral pain that radiates to left arm rated 10.10, with an onset of 3 days. Patient states "I walk a lot and sleep outside". Bilateral lower extremity edema noted. Patient denies Hx of HTN, SOB, and chest pain.  "